# Patient Record
Sex: FEMALE | Race: BLACK OR AFRICAN AMERICAN | NOT HISPANIC OR LATINO | Employment: UNEMPLOYED | URBAN - METROPOLITAN AREA
[De-identification: names, ages, dates, MRNs, and addresses within clinical notes are randomized per-mention and may not be internally consistent; named-entity substitution may affect disease eponyms.]

---

## 2019-05-15 PROBLEM — O60.00 PRETERM LABOR: Status: ACTIVE | Noted: 2019-03-01

## 2019-05-15 LAB — HIV 1+2 AB+HIV1 P24 AG SERPL QL IA: NORMAL

## 2021-05-12 ENCOUNTER — PATIENT MESSAGE (OUTPATIENT)
Dept: RESEARCH | Facility: HOSPITAL | Age: 31
End: 2021-05-12

## 2022-05-16 ENCOUNTER — SOCIAL WORK (OUTPATIENT)
Dept: ADMINISTRATIVE | Facility: OTHER | Age: 32
End: 2022-05-16

## 2022-05-16 NOTE — PROGRESS NOTES
SW met with pt regarding initial OB assessment. Pt stated this is her 8th pregnancy/3-miscarriage. Pt stated lives with her /children-10,9,6,2 and able to perform ADL's independently. Pt stated support system is her /Steve. Pt stated has to re apply for medicaid. SW informed to go down stair to the financial assistance office and apply for medicaid.t stated does not have WIC. Pt stated not going to breastfeed. SW provide pt with information on other community resources.  No other needs identified at this time.    Ayde Luz,MSW  Pager#[phone]

## 2022-05-18 PROBLEM — B96.89 BACTERIAL VAGINOSIS IN PREGNANCY: Status: ACTIVE | Noted: 2022-05-18

## 2022-05-18 PROBLEM — O23.599 BACTERIAL VAGINOSIS IN PREGNANCY: Status: ACTIVE | Noted: 2022-05-18

## 2022-05-20 PROBLEM — Z3A.12 12 WEEKS GESTATION OF PREGNANCY: Status: ACTIVE | Noted: 2022-05-20

## 2022-05-20 PROBLEM — O09.91 ENCOUNTER FOR SUPERVISION OF HIGH RISK PREGNANCY IN FIRST TRIMESTER, ANTEPARTUM: Status: ACTIVE | Noted: 2022-05-20

## 2022-05-20 PROBLEM — J34.89 NASAL CAVITY MASS: Status: ACTIVE | Noted: 2022-05-20

## 2022-05-20 PROBLEM — Z87.59 HISTORY OF PRETERM PREMATURE RUPTURE OF MEMBRANES (PPROM): Status: ACTIVE | Noted: 2022-05-20

## 2022-05-20 PROBLEM — R10.11 RUQ PAIN: Status: ACTIVE | Noted: 2022-05-20

## 2022-05-20 PROBLEM — O09.299 HISTORY OF MISCARRIAGE, CURRENTLY PREGNANT: Status: ACTIVE | Noted: 2022-05-20

## 2022-06-28 PROBLEM — Z3A.18 18 WEEKS GESTATION OF PREGNANCY: Status: ACTIVE | Noted: 2022-05-20

## 2022-10-02 PROBLEM — Z34.90 PREGNANCY: Status: ACTIVE | Noted: 2022-10-02

## 2022-10-16 PROBLEM — Z03.71 ENCOUNTER FOR SUSPECTED PREMATURE RUPTURE OF MEMBRANES, WITH RUPTURE OF MEMBRANES NOT FOUND: Status: ACTIVE | Noted: 2022-10-16

## 2022-10-24 PROBLEM — Z03.71 ENCOUNTER FOR SUSPECTED PREMATURE RUPTURE OF MEMBRANES, WITH RUPTURE OF MEMBRANES NOT FOUND: Status: RESOLVED | Noted: 2022-10-16 | Resolved: 2022-10-24

## 2022-10-24 PROBLEM — O60.00 PRETERM LABOR: Status: RESOLVED | Noted: 2019-03-01 | Resolved: 2022-10-24

## 2022-10-24 PROBLEM — O09.33 LIMITED PRENATAL CARE IN THIRD TRIMESTER: Status: ACTIVE | Noted: 2022-10-24

## 2022-10-24 PROBLEM — Z3A.18 18 WEEKS GESTATION OF PREGNANCY: Status: RESOLVED | Noted: 2022-05-20 | Resolved: 2022-10-24

## 2022-10-24 PROBLEM — R10.11 RUQ PAIN: Status: RESOLVED | Noted: 2022-05-20 | Resolved: 2022-10-24

## 2022-10-24 PROBLEM — O09.93 ENCOUNTER FOR SUPERVISION OF HIGH RISK PREGNANCY IN THIRD TRIMESTER, ANTEPARTUM: Status: ACTIVE | Noted: 2022-05-20

## 2022-10-24 PROBLEM — Z34.90 PREGNANCY: Status: RESOLVED | Noted: 2022-10-02 | Resolved: 2022-10-24

## 2022-11-02 PROBLEM — O47.9 UTERINE CONTRACTIONS: Status: ACTIVE | Noted: 2022-11-02

## 2022-11-06 PROBLEM — O99.810 ABNORMAL O'SULLIVAN GLUCOSE CHALLENGE TEST, ANTEPARTUM: Status: ACTIVE | Noted: 2022-11-06

## 2022-11-10 PROBLEM — O42.92 FULL-TERM PREMATURE RUPTURE OF MEMBRANES: Status: ACTIVE | Noted: 2022-11-10

## 2022-11-12 PROBLEM — O42.92 FULL-TERM PREMATURE RUPTURE OF MEMBRANES: Status: RESOLVED | Noted: 2022-11-10 | Resolved: 2022-11-12

## 2022-11-12 PROBLEM — O99.810 ABNORMAL O'SULLIVAN GLUCOSE CHALLENGE TEST, ANTEPARTUM: Status: RESOLVED | Noted: 2022-11-06 | Resolved: 2022-11-12

## 2023-05-01 PROBLEM — O09.299 HISTORY OF MISCARRIAGE, CURRENTLY PREGNANT: Status: RESOLVED | Noted: 2022-05-20 | Resolved: 2023-05-01
